# Patient Record
Sex: FEMALE | Race: OTHER | HISPANIC OR LATINO | URBAN - METROPOLITAN AREA
[De-identification: names, ages, dates, MRNs, and addresses within clinical notes are randomized per-mention and may not be internally consistent; named-entity substitution may affect disease eponyms.]

---

## 2018-09-24 ENCOUNTER — OUTPATIENT (OUTPATIENT)
Dept: OUTPATIENT SERVICES | Facility: HOSPITAL | Age: 33
LOS: 1 days | Discharge: HOME | End: 2018-09-24

## 2018-09-24 DIAGNOSIS — E78.00 PURE HYPERCHOLESTEROLEMIA, UNSPECIFIED: ICD-10-CM

## 2018-09-24 DIAGNOSIS — D64.9 ANEMIA, UNSPECIFIED: ICD-10-CM

## 2018-09-24 DIAGNOSIS — N18.2 CHRONIC KIDNEY DISEASE, STAGE 2 (MILD): ICD-10-CM

## 2019-08-23 ENCOUNTER — OUTPATIENT (OUTPATIENT)
Dept: OUTPATIENT SERVICES | Facility: HOSPITAL | Age: 34
LOS: 1 days | Discharge: HOME | End: 2019-08-23

## 2019-08-23 VITALS — SYSTOLIC BLOOD PRESSURE: 111 MMHG | DIASTOLIC BLOOD PRESSURE: 65 MMHG | HEART RATE: 80 BPM

## 2019-08-23 VITALS
RESPIRATION RATE: 20 BRPM | SYSTOLIC BLOOD PRESSURE: 111 MMHG | TEMPERATURE: 99 F | DIASTOLIC BLOOD PRESSURE: 65 MMHG | HEART RATE: 80 BPM

## 2019-08-23 NOTE — OB PROVIDER TRIAGE NOTE - HISTORY OF PRESENT ILLNESS
32yo  at 27w3d GA, by LMP c/w 1st trim sono, c/o gas pains and constipation x 2 days. Has h/o IBS on linaclotide. Presented to L&D for evaluation as pt was unsure if she could take stool softeners while pregnant. Also c/o hemorrhoids secondary to straining during defecation. Denies ctx, lower abdominal pain, VB/LOF. Good FM. Admits to drinking only 1 cup of water today. Last BM today- hard pebble-like stool. Last PO intake at 1700. Denies fever, chills, nausea/vomiting, diarrhea, dysuria, urgency, frequency. Denies complications during this pregnancy.

## 2019-08-23 NOTE — OB PROVIDER TRIAGE NOTE - NSOBPROVIDERNOTE_OBGYN_ALL_OB_FT
34yo  at 27w3d GA, GBS unknown, with BPP 8/8, reassuring maternal and fetal status, hemorrhoids and constipation, not in  labor  - d/c home   - ambulation/PO hydration  - stool softeners: colace 100mg BID PRN/dulcolax suppository   - simethicone PRN   -  labor precautions/FKC   - f/u next scheduled appt 32yo  at 27w3d GA, GBS unknown, with BPP 8/8, reassuring maternal and fetal status, hemorrhoids and constipation, not in  labor  - d/c home   - ambulation/PO hydration  - stool softeners: colace 100mg BID PRN/dulcolax suppository   - simethicone PRN   -  labor precautions/FKC   - f/u next scheduled appt     Dr. Ricci and Dr. Tafoya aware

## 2019-08-23 NOTE — OB PROVIDER TRIAGE NOTE - NSHPPHYSICALEXAM_GEN_ALL_CORE
Vital Signs Last 24 Hrs  T(C): 37.4 (23 Aug 2019 20:16), Max: 37.4 (23 Aug 2019 20:16)  T(F): 99.4 (23 Aug 2019 20:16), Max: 99.4 (23 Aug 2019 20:16)  HR: 80 (23 Aug 2019 20:16) (80 - 80)  BP: 111/65 (23 Aug 2019 20:16) (111/65 - 111/65)  RR: 20 (23 Aug 2019 20:16) (20 - 20)    EFM: 145/mod/accels+  [place]: irritable   Abd: soft, gravid, nontender, no palpable ctx  Speculum:   SVE: Vital Signs Last 24 Hrs  T(C): 37.4 (23 Aug 2019 20:16), Max: 37.4 (23 Aug 2019 20:16)  T(F): 99.4 (23 Aug 2019 20:16), Max: 99.4 (23 Aug 2019 20:16)  HR: 80 (23 Aug 2019 20:16) (80 - 80)  BP: 111/65 (23 Aug 2019 20:16) (111/65 - 111/65)  RR: 20 (23 Aug 2019 20:16) (20 - 20)    Udip: neg     EFM: 145/mod/accels+/occ variables  [place]: irritable   Abd: soft, gravid, nontender, no palpable ctx  Speculum: cervix appears normal, C/L/P, no active bleeding per os  SVE: C/L/P, vtx by sono, intact

## 2019-11-17 ENCOUNTER — INPATIENT (INPATIENT)
Facility: HOSPITAL | Age: 34
LOS: 1 days | Discharge: HOME | End: 2019-11-19
Attending: OBSTETRICS & GYNECOLOGY | Admitting: OBSTETRICS & GYNECOLOGY

## 2019-11-17 VITALS — HEART RATE: 86 BPM | DIASTOLIC BLOOD PRESSURE: 85 MMHG | SYSTOLIC BLOOD PRESSURE: 117 MMHG

## 2019-11-17 PROBLEM — K58.9 IRRITABLE BOWEL SYNDROME WITHOUT DIARRHEA: Chronic | Status: ACTIVE | Noted: 2019-08-23

## 2019-11-17 LAB
ABO RH CONFIRMATION: SIGNIFICANT CHANGE UP
APPEARANCE UR: CLEAR — SIGNIFICANT CHANGE UP
BACTERIA # UR AUTO: ABNORMAL
BASOPHILS # BLD AUTO: 0.01 K/UL — SIGNIFICANT CHANGE UP (ref 0–0.2)
BASOPHILS NFR BLD AUTO: 0.1 % — SIGNIFICANT CHANGE UP (ref 0–1)
BILIRUB UR-MCNC: NEGATIVE — SIGNIFICANT CHANGE UP
BLD GP AB SCN SERPL QL: SIGNIFICANT CHANGE UP
COLOR SPEC: SIGNIFICANT CHANGE UP
DIFF PNL FLD: ABNORMAL
EOSINOPHIL # BLD AUTO: 0.06 K/UL — SIGNIFICANT CHANGE UP (ref 0–0.7)
EOSINOPHIL NFR BLD AUTO: 0.6 % — SIGNIFICANT CHANGE UP (ref 0–8)
EPI CELLS # UR: 7 /HPF — HIGH (ref 0–5)
GLUCOSE UR QL: NEGATIVE — SIGNIFICANT CHANGE UP
HCT VFR BLD CALC: 39.5 % — SIGNIFICANT CHANGE UP (ref 37–47)
HGB BLD-MCNC: 12.5 G/DL — SIGNIFICANT CHANGE UP (ref 12–16)
HYALINE CASTS # UR AUTO: 1 /LPF — SIGNIFICANT CHANGE UP (ref 0–7)
IMM GRANULOCYTES NFR BLD AUTO: 0.5 % — HIGH (ref 0.1–0.3)
KETONES UR-MCNC: NEGATIVE — SIGNIFICANT CHANGE UP
LEUKOCYTE ESTERASE UR-ACNC: NEGATIVE — SIGNIFICANT CHANGE UP
LYMPHOCYTES # BLD AUTO: 1.84 K/UL — SIGNIFICANT CHANGE UP (ref 1.2–3.4)
LYMPHOCYTES # BLD AUTO: 19.7 % — LOW (ref 20.5–51.1)
MCHC RBC-ENTMCNC: 25.8 PG — LOW (ref 27–31)
MCHC RBC-ENTMCNC: 31.6 G/DL — LOW (ref 32–37)
MCV RBC AUTO: 81.6 FL — SIGNIFICANT CHANGE UP (ref 81–99)
MONOCYTES # BLD AUTO: 0.91 K/UL — HIGH (ref 0.1–0.6)
MONOCYTES NFR BLD AUTO: 9.7 % — HIGH (ref 1.7–9.3)
NEUTROPHILS # BLD AUTO: 6.47 K/UL — SIGNIFICANT CHANGE UP (ref 1.4–6.5)
NEUTROPHILS NFR BLD AUTO: 69.4 % — SIGNIFICANT CHANGE UP (ref 42.2–75.2)
NITRITE UR-MCNC: NEGATIVE — SIGNIFICANT CHANGE UP
NRBC # BLD: 0 /100 WBCS — SIGNIFICANT CHANGE UP (ref 0–0)
PH UR: 6.5 — SIGNIFICANT CHANGE UP (ref 5–8)
PLATELET # BLD AUTO: 189 K/UL — SIGNIFICANT CHANGE UP (ref 130–400)
PRENATAL SYPHILIS TEST: SIGNIFICANT CHANGE UP
PROT UR-MCNC: SIGNIFICANT CHANGE UP
RBC # BLD: 4.84 M/UL — SIGNIFICANT CHANGE UP (ref 4.2–5.4)
RBC # FLD: 13.9 % — SIGNIFICANT CHANGE UP (ref 11.5–14.5)
RBC CASTS # UR COMP ASSIST: 33 /HPF — HIGH (ref 0–4)
SP GR SPEC: 1.02 — SIGNIFICANT CHANGE UP (ref 1.01–1.02)
UROBILINOGEN FLD QL: SIGNIFICANT CHANGE UP
WBC # BLD: 9.34 K/UL — SIGNIFICANT CHANGE UP (ref 4.8–10.8)
WBC # FLD AUTO: 9.34 K/UL — SIGNIFICANT CHANGE UP (ref 4.8–10.8)
WBC UR QL: 6 /HPF — HIGH (ref 0–5)

## 2019-11-17 RX ORDER — SODIUM CHLORIDE 9 MG/ML
1000 INJECTION, SOLUTION INTRAVENOUS
Refills: 0 | Status: DISCONTINUED | OUTPATIENT
Start: 2019-11-17 | End: 2019-11-18

## 2019-11-17 RX ORDER — SODIUM CHLORIDE 9 MG/ML
3 INJECTION INTRAMUSCULAR; INTRAVENOUS; SUBCUTANEOUS EVERY 8 HOURS
Refills: 0 | Status: DISCONTINUED | OUTPATIENT
Start: 2019-11-17 | End: 2019-11-19

## 2019-11-17 RX ORDER — DEXAMETHASONE 0.5 MG/5ML
4 ELIXIR ORAL EVERY 6 HOURS
Refills: 0 | Status: DISCONTINUED | OUTPATIENT
Start: 2019-11-17 | End: 2019-11-19

## 2019-11-17 RX ORDER — ACETAMINOPHEN 500 MG
975 TABLET ORAL
Refills: 0 | Status: DISCONTINUED | OUTPATIENT
Start: 2019-11-17 | End: 2019-11-19

## 2019-11-17 RX ORDER — DIBUCAINE 1 %
1 OINTMENT (GRAM) RECTAL EVERY 6 HOURS
Refills: 0 | Status: DISCONTINUED | OUTPATIENT
Start: 2019-11-17 | End: 2019-11-19

## 2019-11-17 RX ORDER — DIPHENHYDRAMINE HCL 50 MG
25 CAPSULE ORAL EVERY 6 HOURS
Refills: 0 | Status: DISCONTINUED | OUTPATIENT
Start: 2019-11-17 | End: 2019-11-19

## 2019-11-17 RX ORDER — OXYTOCIN 10 UNIT/ML
2 VIAL (ML) INJECTION
Qty: 30 | Refills: 0 | Status: DISCONTINUED | OUTPATIENT
Start: 2019-11-17 | End: 2019-11-19

## 2019-11-17 RX ORDER — MAGNESIUM HYDROXIDE 400 MG/1
30 TABLET, CHEWABLE ORAL
Refills: 0 | Status: DISCONTINUED | OUTPATIENT
Start: 2019-11-17 | End: 2019-11-19

## 2019-11-17 RX ORDER — ONDANSETRON 8 MG/1
4 TABLET, FILM COATED ORAL EVERY 6 HOURS
Refills: 0 | Status: DISCONTINUED | OUTPATIENT
Start: 2019-11-17 | End: 2019-11-19

## 2019-11-17 RX ORDER — GLYCERIN ADULT
1 SUPPOSITORY, RECTAL RECTAL AT BEDTIME
Refills: 0 | Status: DISCONTINUED | OUTPATIENT
Start: 2019-11-17 | End: 2019-11-19

## 2019-11-17 RX ORDER — PRAMOXINE HYDROCHLORIDE 150 MG/15G
1 AEROSOL, FOAM RECTAL EVERY 4 HOURS
Refills: 0 | Status: DISCONTINUED | OUTPATIENT
Start: 2019-11-17 | End: 2019-11-19

## 2019-11-17 RX ORDER — KETOROLAC TROMETHAMINE 30 MG/ML
30 SYRINGE (ML) INJECTION ONCE
Refills: 0 | Status: DISCONTINUED | OUTPATIENT
Start: 2019-11-17 | End: 2019-11-17

## 2019-11-17 RX ORDER — BENZOCAINE 10 %
1 GEL (GRAM) MUCOUS MEMBRANE EVERY 6 HOURS
Refills: 0 | Status: DISCONTINUED | OUTPATIENT
Start: 2019-11-17 | End: 2019-11-19

## 2019-11-17 RX ORDER — OXYCODONE HYDROCHLORIDE 5 MG/1
5 TABLET ORAL
Refills: 0 | Status: DISCONTINUED | OUTPATIENT
Start: 2019-11-17 | End: 2019-11-19

## 2019-11-17 RX ORDER — INFLUENZA VIRUS VACCINE 15; 15; 15; 15 UG/.5ML; UG/.5ML; UG/.5ML; UG/.5ML
0.5 SUSPENSION INTRAMUSCULAR ONCE
Refills: 0 | Status: DISCONTINUED | OUTPATIENT
Start: 2019-11-17 | End: 2019-11-19

## 2019-11-17 RX ORDER — IBUPROFEN 200 MG
600 TABLET ORAL EVERY 6 HOURS
Refills: 0 | Status: DISCONTINUED | OUTPATIENT
Start: 2019-11-17 | End: 2019-11-19

## 2019-11-17 RX ORDER — LANOLIN
1 OINTMENT (GRAM) TOPICAL EVERY 6 HOURS
Refills: 0 | Status: DISCONTINUED | OUTPATIENT
Start: 2019-11-17 | End: 2019-11-19

## 2019-11-17 RX ORDER — NALOXONE HYDROCHLORIDE 4 MG/.1ML
0.1 SPRAY NASAL
Refills: 0 | Status: DISCONTINUED | OUTPATIENT
Start: 2019-11-17 | End: 2019-11-19

## 2019-11-17 RX ORDER — HYDROCORTISONE 1 %
1 OINTMENT (GRAM) TOPICAL EVERY 6 HOURS
Refills: 0 | Status: DISCONTINUED | OUTPATIENT
Start: 2019-11-17 | End: 2019-11-19

## 2019-11-17 RX ORDER — IBUPROFEN 200 MG
600 TABLET ORAL EVERY 6 HOURS
Refills: 0 | Status: COMPLETED | OUTPATIENT
Start: 2019-11-17 | End: 2020-10-15

## 2019-11-17 RX ORDER — AER TRAVELER 0.5 G/1
1 SOLUTION RECTAL; TOPICAL EVERY 4 HOURS
Refills: 0 | Status: DISCONTINUED | OUTPATIENT
Start: 2019-11-17 | End: 2019-11-19

## 2019-11-17 RX ORDER — ACETAMINOPHEN 500 MG
650 TABLET ORAL ONCE
Refills: 0 | Status: COMPLETED | OUTPATIENT
Start: 2019-11-17 | End: 2019-11-17

## 2019-11-17 RX ORDER — OXYCODONE HYDROCHLORIDE 5 MG/1
5 TABLET ORAL ONCE
Refills: 0 | Status: DISCONTINUED | OUTPATIENT
Start: 2019-11-17 | End: 2019-11-19

## 2019-11-17 RX ORDER — OXYTOCIN 10 UNIT/ML
333.33 VIAL (ML) INJECTION
Qty: 20 | Refills: 0 | Status: DISCONTINUED | OUTPATIENT
Start: 2019-11-17 | End: 2019-11-19

## 2019-11-17 RX ORDER — SIMETHICONE 80 MG/1
80 TABLET, CHEWABLE ORAL EVERY 4 HOURS
Refills: 0 | Status: DISCONTINUED | OUTPATIENT
Start: 2019-11-17 | End: 2019-11-19

## 2019-11-17 RX ADMIN — SODIUM CHLORIDE 3 MILLILITER(S): 9 INJECTION INTRAMUSCULAR; INTRAVENOUS; SUBCUTANEOUS at 21:30

## 2019-11-17 RX ADMIN — Medication 650 MILLIGRAM(S): at 18:07

## 2019-11-17 RX ADMIN — Medication 2 MILLIUNIT(S)/MIN: at 09:44

## 2019-11-17 NOTE — PROGRESS NOTE ADULT - SUBJECTIVE AND OBJECTIVE BOX
PGY1 Note    Patient seen at bedside. Pt states that she is experiencing intermittent pressure with contractions but still does not have an urge to push    T(F): 99.32 ( @ 16:48), Max: 99.32 ( @ 16:48)  HR: 113 ( @ 19:06)  BP: 123/57 ( @ 19:06) (101/57 - 147/78)  RR: 18 ( @ 08:39)    EFM: 160bpm/moderate variability/+accels  TOCO: q2min   SVE: 10/100/ per Dr. Landeros at 1753    Medications:  acetaminophen   Tablet ..: 650 ( @ 18:07)  oxytocin Infusion: 2 ( @ 09:36)      Labs:                        12.5   9.34  )-----------( 189      ( 2019 05:15 )             39.5           Prenatal Syphilis Test: Nonreact ( @ 05:15)  Antibody Screen: NEG (19 @ 05:15)    Urinalysis Basic - ( 2019 05:30 )    Color: Light Yellow / Appearance: Clear / S.023 / pH: x  Gluc: x / Ketone: Negative  / Bili: Negative / Urobili: <2 mg/dL   Blood: x / Protein: Trace / Nitrite: Negative   Leuk Esterase: Negative / RBC: 33 /HPF / WBC 6 /HPF   Sq Epi: x / Non Sq Epi: 7 /HPF / Bacteria: Few

## 2019-11-17 NOTE — PROGRESS NOTE ADULT - ASSESSMENT
35 yo  @39w5d, GBS neg, isolated elevated BPs, s/p epi, AROM, on pitocin, in active labor, doing well.    - pain management w/ epidural  - cont EFM and toco  - monitor vitals  - clear liquids diet, IV hydration  - c/w pitocin  - f/up rubella, hepB, HIV, UDS    Dr. Nguyen and Dr. Landeros aware.

## 2019-11-17 NOTE — PROGRESS NOTE ADULT - SUBJECTIVE AND OBJECTIVE BOX
PGY1 Note    Patient seen at bedside. No complaints at the moment. Pt still does not feel the urge to push.     T(F): 99.32 ( @ 16:48), Max: 99.32 ( @ 16:48)  HR: 93 ( @ 17:42)  BP: 118/63 ( @ 17:42) (101/57 - 147/78)  RR: 18 ( @ 08:39)    EFM:  TOCO:  SVE: 10/100/1 per Dr. Landeros at 1753    Medications:  acetaminophen   Tablet ..: 650 ( @ 18:07)  oxytocin Infusion: 2 ( @ 09:36)      Labs:                        12.5   9.34  )-----------( 189      ( 2019 05:15 )             39.5           Prenatal Syphilis Test: Nonreact ( @ 05:15)  Antibody Screen: NEG (19 @ 05:15)    Urinalysis Basic - ( 2019 05:30 )    Color: Light Yellow / Appearance: Clear / S.023 / pH: x  Gluc: x / Ketone: Negative  / Bili: Negative / Urobili: <2 mg/dL   Blood: x / Protein: Trace / Nitrite: Negative   Leuk Esterase: Negative / RBC: 33 /HPF / WBC 6 /HPF   Sq Epi: x / Non Sq Epi: 7 /HPF / Bacteria: Few PGY1 Note    Patient seen at bedside. No complaints at the moment. Pt still does not feel the urge to push.     T(F): 99.32 ( @ 16:48), Max: 99.32 ( @ 16:48)  HR: 93 ( @ 17:42)  BP: 118/63 ( @ 17:42) (101/57 - 147/78)  RR: 18 ( @ 08:39)    EFM: 155bpm/moderate variability/+accels  TOCO: q2min   SVE: 10/100/1 per Dr. Landeros at 1753    Medications:  acetaminophen   Tablet ..: 650 ( @ 18:07)  oxytocin Infusion: 2 ( @ 09:36)      Labs:                        12.5   9.34  )-----------( 189      ( 2019 05:15 )             39.5           Prenatal Syphilis Test: Nonreact ( @ 05:15)  Antibody Screen: NEG (19 @ 05:15)    Urinalysis Basic - ( 2019 05:30 )    Color: Light Yellow / Appearance: Clear / S.023 / pH: x  Gluc: x / Ketone: Negative  / Bili: Negative / Urobili: <2 mg/dL   Blood: x / Protein: Trace / Nitrite: Negative   Leuk Esterase: Negative / RBC: 33 /HPF / WBC 6 /HPF   Sq Epi: x / Non Sq Epi: 7 /HPF / Bacteria: Few

## 2019-11-17 NOTE — PROGRESS NOTE ADULT - ASSESSMENT
33yo  at 39w5d GA, GBS neg, in active labor, on pitocin for augmentation  - f/u prenatal records from office  - f/u pending labs  - continuous EFM/toco  - clear liquids  - pain mgmt prn   - IV hydration  - anticipate      Dr. Landeros aware.

## 2019-11-17 NOTE — PROGRESS NOTE ADULT - SUBJECTIVE AND OBJECTIVE BOX
OBGYN PGY3 Note:     Patient seen and examined at bedside. Comfortable, denies complaints.      T(C): 37 (19 @ 08:39), Max: 37.1 (19 @ 03:50)  HR: 71 (19 @ 10:13) (71 - 92)  BP: 105/59 (19 @ 10:13) (105/59 - 144/87)  RR: 18 (19 @ 08:39) (18 - 18)    EFM: 135/mod/accels+  [place]: q2-4  SVE: 6/80/-2 at 0937     Meds: dexAMETHasone  Injectable 4 milliGRAM(s) IV Push every 6 hours PRN  influenza   Vaccine 0.5 milliLiter(s) IntraMuscular once  lactated ringers. 1000 milliLiter(s) IV Continuous <Continuous>  naloxone Injectable 0.1 milliGRAM(s) IV Push every 3 minutes PRN  ondansetron Injectable 4 milliGRAM(s) IV Push every 6 hours PRN  oxytocin Infusion 333.333 milliUNIT(s)/Min IV Continuous <Continuous>  oxytocin Infusion 2 milliUNIT(s)/Min IV Continuous <Continuous> started at 0944, currently at 4mu/min  Epidural at 0844    Labs:                        12.5   9.34  )-----------( 189      ( 2019 05:15 )             39.5         Urinalysis Basic - ( 2019 05:30 )    Color: Light Yellow / Appearance: Clear / S.023 / pH: x  Gluc: x / Ketone: Negative  / Bili: Negative / Urobili: <2 mg/dL   Blood: x / Protein: Trace / Nitrite: Negative   Leuk Esterase: Negative / RBC: 33 /HPF / WBC 6 /HPF   Sq Epi: x / Non Sq Epi: 7 /HPF / Bacteria: Few         Prenatal Syphilis Test: Nonreact (19 @ 05:15)  ABO RH Interpretation: B POS (19 @ 05:15)

## 2019-11-17 NOTE — PROGRESS NOTE ADULT - SUBJECTIVE AND OBJECTIVE BOX
PGY1 Note    Patient seen at bedside after complaining of constant pressure and an urge to push.     T(F): 99.32 ( @ 16:48), Max: 99.32 ( @ 16:48)  HR: 120 ( @ 19:36)  BP: 126/60 ( @ 19:36) (101/57 - 147/78)  RR: 18 ( @ 08:39)    EFM: 160bpm/moderate variability/+accels  TOCO: q2min  SVE: 10/1, per Dr. Landeros    Medications:  acetaminophen   Tablet ..: 650 ( @ 18:07)  oxytocin Infusion: 2 ( @ 09:36)      Labs:                        12.5   9.34  )-----------( 189      ( 2019 05:15 )             39.5           Prenatal Syphilis Test: Nonreact ( @ 05:15)  Antibody Screen: NEG (19 @ 05:15)    Urinalysis Basic - ( 2019 05:30 )    Color: Light Yellow / Appearance: Clear / S.023 / pH: x  Gluc: x / Ketone: Negative  / Bili: Negative / Urobili: <2 mg/dL   Blood: x / Protein: Trace / Nitrite: Negative   Leuk Esterase: Negative / RBC: 33 /HPF / WBC 6 /HPF   Sq Epi: x / Non Sq Epi: 7 /HPF / Bacteria: Few

## 2019-11-17 NOTE — PROGRESS NOTE ADULT - SUBJECTIVE AND OBJECTIVE BOX
PGY 2 Note    S: Pt seen at bedside for labor check. Doing well, experiencing intermittent pressure with contractions.    Vital Signs Last 24 Hrs  T(F): 99.32 (2019 16:48), Max: 99.32 (2019 16:48)  HR: 101 (2019 17:26) (65 - 106)  BP: 137/64 (2019 17:26) (101/57 - 147/78)  RR: 18 (2019 08:39) (18 - 18)    EFM: 155/mod lisa/+accel  TOCO: q2-3min  SVE: 9/100/0, vtx, IUPC in place @1543    Labs:                        12.5   9.34  )-----------( 189      ( 2019 05:15 )             39.5             ABO RH Interpretation: B POS (19 @ 05:15)    Urinalysis Basic - ( 2019 05:30 )    Color: Light Yellow / Appearance: Clear / S.023 / pH: x  Gluc: x / Ketone: Negative  / Bili: Negative / Urobili: <2 mg/dL   Blood: x / Protein: Trace / Nitrite: Negative   Leuk Esterase: Negative / RBC: 33 /HPF / WBC 6 /HPF   Sq Epi: x / Non Sq Epi: 7 /HPF / Bacteria: Few        Prenatal Syphilis Test: Nonreact (19 @ 05:15)      Meds:  @0845 epidural  @0944 pit started, now @14mu/min

## 2019-11-17 NOTE — OB PROVIDER H&P - NS_FETALPRESSONO_OBGYN_ALL_OB
Patient requesting refill of:    Alprazolam  Qty: 90  Days: 90  Refills: 1 Prescribed: 11/6/2017  Last available refill dispensed: 11/6/2017    PDMP reviewed?  Yes  [x]    No  []   Requesting/dispensing early refills?  Yes  []    No  [x]   Other controlled substances? Short term fill of Guaifenesin-Codeine in January    Date last seen 11-6-17  Next scheduled visit 11-7-18       Cephalic

## 2019-11-17 NOTE — OB PROVIDER H&P - HISTORY OF PRESENT ILLNESS
34yo  at 39w5d EDC 19 by LMP here after noticing red vaginal discharge while urinating. Se reports only one episode, denies any dysuria, urinary urgency or frequency. Denies ctx or LOF. Good fetal movement. Denies any complications with this pregnancy. Last appointment at PMD office on , pt was 1cm dilated. GBS neg per patient.

## 2019-11-17 NOTE — PROGRESS NOTE ADULT - SUBJECTIVE AND OBJECTIVE BOX
PGY 2 Note    S: Pt seen and examined at bedside for labor check. Doing well, pain controlled w/ epidural.     Vital Signs Last 24 Hrs  T(F): 98.6 (2019 04:58), Max: 98.8 (2019 03:54)  HR: 90 (2019 14:41) (65 - 92)  BP: 129/79 (2019 14:41) (101/57 - 144/87)  RR: 18 (2019 08:39) (18 - 18)    EFM: 135/mod lisa/+accel  TOCO: q2-3min  SVE: 8/100/-1, vtx, IUPC in place    Labs:                        12.5   9.34  )-----------( 189      ( 2019 05:15 )             39.5             ABO RH Interpretation: B POS (19 @ 05:15)    Urinalysis Basic - ( 2019 05:30 )    Color: Light Yellow / Appearance: Clear / S.023 / pH: x  Gluc: x / Ketone: Negative  / Bili: Negative / Urobili: <2 mg/dL   Blood: x / Protein: Trace / Nitrite: Negative   Leuk Esterase: Negative / RBC: 33 /HPF / WBC 6 /HPF   Sq Epi: x / Non Sq Epi: 7 /HPF / Bacteria: Few        Prenatal Syphilis Test: Nonreact (19 @ 05:15)      Meds:  @0845 epidural  @0944 pit started, now @16mu/min

## 2019-11-17 NOTE — PROGRESS NOTE ADULT - ASSESSMENT
35 yo  @39w5d, GBS neg, isolated elevated BPs, s/p epi, AROM, on pitocin, in active labor, doing well.    - pain management w/ epidural  - cont EFM and toco  - monitor vitals  - clear liquids diet, IV hydration  - c/w pitocin    Dr. Nguyen and Dr. Landeros aware. 35 yo  @39w5d, GBS neg, isolated elevated BPs, s/p epi, AROM, on pitocin, in active labor, doing well.    - pain management w/ epidural  - cont EFM and toco  - monitor vitals  - clear liquids diet, IV hydration  - c/w pitocin  - f/up rubella, hepB, HIV, UDS    Dr. Nguyen and Dr. Landeros aware.

## 2019-11-17 NOTE — OB PROVIDER H&P - ASSESSMENT
A/P: 33yo  at 39w5d, GBS neg, in labor at term    - admit to L&D  - pitocin for augmentation  - f/u admission labs, prenatal labs  - cont EFM and toco  - pain management PRN  - IV hydration, clear liquid diet  - monitor vitals    Dr. Krueger and Dr. Landeros aware.

## 2019-11-17 NOTE — OB PROVIDER DELIVERY SUMMARY - NSPROVIDERDELIVERYNOTE_OBGYN_ALL_OB_FT
TOBY  compound left hand  Meconium therefore no stimulation of baby  baby handed off to awaiting pediatritions immediately   1st degree lac repaired with chromic  apgar 7/9

## 2019-11-17 NOTE — PROGRESS NOTE ADULT - ASSESSMENT
A/P: 35 yo  at 39w5d, GBS neg, isolated elevated BPs, s/p epi, AROM, on pitocin, in active labor, doing well.  - pain management w/ epidural  - cont EFM and toco  - monitor vitals  - clear liquids diet, IV hydration  - c/w pitocin  - f/up rubella, hepB, HIV, UDS    Dr. Tafoya aware and Dr. Landeros to be made aware

## 2019-11-17 NOTE — PROGRESS NOTE ADULT - ASSESSMENT
A/P: 33 yo  at 39w5d, GBS neg, isolated elevated BPs, s/p epi, AROM, on pitocin, in active labor, doing well.  - discontinue epidural as pt not pushing effectively per PMD  - cont EFM and toco  - monitor vitals  - clear liquids diet, IV hydration  - c/w pitocin  - f/up rubella, hepB, HIV, UDS  - re-evaluate pt prn when pt feeling more of a desire to push     Dr. Tafoya and Dr. Landeros aware

## 2019-11-17 NOTE — OB PROVIDER H&P - NSHPPHYSICALEXAM_GEN_ALL_CORE
Vital Signs Last 24 Hrs  T(C): 37.1 (17 Nov 2019 03:54), Max: 37.1 (17 Nov 2019 03:50)  T(F): 98.8 (17 Nov 2019 03:54), Max: 98.8 (17 Nov 2019 03:54)  HR: 86 (17 Nov 2019 03:54) (86 - 86)  BP: 117/85 (17 Nov 2019 03:54) (117/85 - 117/85)  RR: 18 (17 Nov 2019 03:54) (18 - 18)    Gen: AOx3, no acute distress  CVS: RRR  Lungs: CTABL  Abd: soft, gravid, non tender, mildly palpable contractions  Ext: No edema bilaterally    Udip     EFM:  140/mod/+accels; cat 1  [place]: q2-4 mins  SVE: 4/50/-2 vertex intact     Speculum exam: 5cc of dark blood and mucus in vagina, no active bleeding, no lesions    BSS: cephalic, ant placenta, MVP 6.9cm

## 2019-11-17 NOTE — OB RN PATIENT PROFILE - NS TRANSFER PATIENT BELONGINGS
Jeffrey Dub 38   Date:   1/29/2021     Name:   Estella Hartmann    YOB: 1953   MRN:   [id number]       WHERE IS YOUR PAIN NOW? [place] the areas on your body where you feel the described sensations.   Use the appropriate None

## 2019-11-17 NOTE — PROGRESS NOTE ADULT - ASSESSMENT
A/P: 33 yo  at 39w5d, GBS neg, isolated elevated BPs, s/p epi, AROM, on pitocin, in active labor, doing well.  - pain management w/ epidural  - cont EFM and toco  - monitor vitals  - clear liquids diet, IV hydration  - c/w pitocin  - f/up rubella, hepB, HIV, UDS    Dr. Tafoya aware and Dr. Landeros to be made aware

## 2019-11-18 LAB
AMPHET UR-MCNC: NEGATIVE — SIGNIFICANT CHANGE UP
BARBITURATES UR SCN-MCNC: NEGATIVE — SIGNIFICANT CHANGE UP
BASOPHILS # BLD AUTO: 0 K/UL — SIGNIFICANT CHANGE UP (ref 0–0.2)
BASOPHILS NFR BLD AUTO: 0 % — SIGNIFICANT CHANGE UP (ref 0–1)
BENZODIAZ UR-MCNC: NEGATIVE — SIGNIFICANT CHANGE UP
BUPRENORPHINE SCREEN, URINE RESULT: NEGATIVE — SIGNIFICANT CHANGE UP
COCAINE METAB.OTHER UR-MCNC: NEGATIVE — SIGNIFICANT CHANGE UP
CREAT ?TM UR-MCNC: 60 MG/DL — SIGNIFICANT CHANGE UP
EOSINOPHIL # BLD AUTO: 0 K/UL — SIGNIFICANT CHANGE UP (ref 0–0.7)
EOSINOPHIL NFR BLD AUTO: 0 % — SIGNIFICANT CHANGE UP (ref 0–8)
FENTANYL UR QL: NEGATIVE — SIGNIFICANT CHANGE UP
GIANT PLATELETS BLD QL SMEAR: PRESENT — SIGNIFICANT CHANGE UP
HBV SURFACE AG SER-ACNC: SIGNIFICANT CHANGE UP
HCT VFR BLD CALC: 32.8 % — LOW (ref 37–47)
HGB BLD-MCNC: 10.7 G/DL — LOW (ref 12–16)
HIV 1+2 AB+HIV1 P24 AG SERPL QL IA: SIGNIFICANT CHANGE UP
L&D DRUG SCREEN, URINE: SIGNIFICANT CHANGE UP
LYMPHOCYTES # BLD AUTO: 0.84 K/UL — LOW (ref 1.2–3.4)
LYMPHOCYTES # BLD AUTO: 2.6 % — LOW (ref 20.5–51.1)
MANUAL SMEAR VERIFICATION: SIGNIFICANT CHANGE UP
MCHC RBC-ENTMCNC: 26.1 PG — LOW (ref 27–31)
MCHC RBC-ENTMCNC: 32.6 G/DL — SIGNIFICANT CHANGE UP (ref 32–37)
MCV RBC AUTO: 80 FL — LOW (ref 81–99)
METHADONE UR-MCNC: NEGATIVE — SIGNIFICANT CHANGE UP
MONOCYTES # BLD AUTO: 0.84 K/UL — HIGH (ref 0.1–0.6)
MONOCYTES NFR BLD AUTO: 2.6 % — SIGNIFICANT CHANGE UP (ref 1.7–9.3)
NEUTROPHILS # BLD AUTO: 29.68 K/UL — HIGH (ref 1.4–6.5)
NEUTROPHILS NFR BLD AUTO: 92.2 % — HIGH (ref 42.2–75.2)
OPIATES UR-MCNC: NEGATIVE — SIGNIFICANT CHANGE UP
OXYCODONE UR-MCNC: NEGATIVE — SIGNIFICANT CHANGE UP
PCP UR-MCNC: NEGATIVE — SIGNIFICANT CHANGE UP
PLAT MORPH BLD: NORMAL — SIGNIFICANT CHANGE UP
PLATELET # BLD AUTO: 161 K/UL — SIGNIFICANT CHANGE UP (ref 130–400)
PROPOXYPHENE QUALITATIVE URINE RESULT: NEGATIVE — SIGNIFICANT CHANGE UP
PROT ?TM UR-MCNC: 51 MG/DLG/24H — SIGNIFICANT CHANGE UP
PROT/CREAT UR-RTO: 0.8 RATIO — HIGH (ref 0–0.2)
RBC # BLD: 4.1 M/UL — LOW (ref 4.2–5.4)
RBC # FLD: 13.7 % — SIGNIFICANT CHANGE UP (ref 11.5–14.5)
RBC BLD AUTO: NORMAL — SIGNIFICANT CHANGE UP
RUBV IGG SER-ACNC: 2 INDEX — SIGNIFICANT CHANGE UP
RUBV IGG SER-IMP: POSITIVE — SIGNIFICANT CHANGE UP
VARIANT LYMPHS # BLD: 2.6 % — SIGNIFICANT CHANGE UP (ref 0–5)
WBC # BLD: 32.19 K/UL — HIGH (ref 4.8–10.8)
WBC # FLD AUTO: 32.19 K/UL — HIGH (ref 4.8–10.8)

## 2019-11-18 RX ADMIN — Medication 600 MILLIGRAM(S): at 14:00

## 2019-11-18 RX ADMIN — Medication 975 MILLIGRAM(S): at 08:36

## 2019-11-18 RX ADMIN — SODIUM CHLORIDE 3 MILLILITER(S): 9 INJECTION INTRAMUSCULAR; INTRAVENOUS; SUBCUTANEOUS at 20:49

## 2019-11-18 RX ADMIN — SODIUM CHLORIDE 3 MILLILITER(S): 9 INJECTION INTRAMUSCULAR; INTRAVENOUS; SUBCUTANEOUS at 05:06

## 2019-11-18 RX ADMIN — Medication 975 MILLIGRAM(S): at 19:00

## 2019-11-18 RX ADMIN — SODIUM CHLORIDE 3 MILLILITER(S): 9 INJECTION INTRAMUSCULAR; INTRAVENOUS; SUBCUTANEOUS at 14:00

## 2019-11-18 RX ADMIN — Medication 600 MILLIGRAM(S): at 11:15

## 2019-11-18 RX ADMIN — Medication 600 MILLIGRAM(S): at 23:57

## 2019-11-18 RX ADMIN — Medication 1 TABLET(S): at 11:15

## 2019-11-18 RX ADMIN — Medication 600 MILLIGRAM(S): at 00:05

## 2019-11-18 RX ADMIN — Medication 600 MILLIGRAM(S): at 17:18

## 2019-11-18 RX ADMIN — Medication 600 MILLIGRAM(S): at 05:06

## 2019-11-18 RX ADMIN — Medication 975 MILLIGRAM(S): at 14:24

## 2019-11-18 RX ADMIN — Medication 975 MILLIGRAM(S): at 20:50

## 2019-11-18 NOTE — PROGRESS NOTE ADULT - ASSESSMENT
A/P: 33yo now  PPD#1 S/P , with gHTN r/o pre-eclapmpsia, with blood pressures postpartum wnl, recovering well   - encourage ambulation  - encourage PO hydration  - monitor vitals, bleeding  - f/u AM CBC   - f/u Upr/cr (nurses aware to collect)  -f/u HIV, Hep B, and rubella   - routine postpartum course  - anticipate d/c home tomorrow    Dr. Diaz aware and Dr. Ladneros to be made aware

## 2019-11-18 NOTE — PROGRESS NOTE ADULT - SUBJECTIVE AND OBJECTIVE BOX
DION MUHAMMAD  34y  Female    PGY1 Note:  PPD#1   Pt is recovering well, no acute complaints. Pt states her pain is well controlled. Pt denies fever, chills, nausea, vomiting, SOB, chest pain, extremity swelling or pain. Pt denies HA, vision changes, RUQ or epigastric pain, or sudden onset of lower extremity swelling.    Ambulating: Yes  Voiding: Yes  Flatus: Yes  Bowel movements: Yes   Breast or bottle feeding: Both   Diet: Regular    MEDICATIONS  (STANDING):  acetaminophen   Tablet .. 975 milliGRAM(s) Oral <User Schedule>  ibuprofen  Tablet. 600 milliGRAM(s) Oral every 6 hours  influenza   Vaccine 0.5 milliLiter(s) IntraMuscular once  oxytocin Infusion 333.333 milliUNIT(s)/Min (1000 mL/Hr) IV Continuous <Continuous>  oxytocin Infusion 2 milliUNIT(s)/Min (2 mL/Hr) IV Continuous <Continuous>  prenatal multivitamin 1 Tablet(s) Oral daily  sodium chloride 0.9% lock flush 3 milliLiter(s) IV Push every 8 hours    MEDICATIONS  (PRN):  benzocaine 20%/menthol 0.5% Spray 1 Spray(s) Topical every 6 hours PRN for Perineal discomfort  dexAMETHasone  Injectable 4 milliGRAM(s) IV Push every 6 hours PRN Nausea  dibucaine 1% Ointment 1 Application(s) Topical every 6 hours PRN Perineal discomfort  diphenhydrAMINE 25 milliGRAM(s) Oral every 6 hours PRN Pruritus  glycerin Suppository - Adult 1 Suppository(s) Rectal at bedtime PRN Constipation  hydrocortisone 1% Cream 1 Application(s) Topical every 6 hours PRN Moderate Pain (4-6)  lanolin Ointment 1 Application(s) Topical every 6 hours PRN nipple soreness  magnesium hydroxide Suspension 30 milliLiter(s) Oral two times a day PRN Constipation  naloxone Injectable 0.1 milliGRAM(s) IV Push every 3 minutes PRN For ANY of the following changes in patient status:  A. RR LESS THAN 10 breaths per minute, B. Oxygen saturation LESS THAN 90%, C. Sedation score of 6  ondansetron Injectable 4 milliGRAM(s) IV Push every 6 hours PRN Nausea  oxyCODONE    IR 5 milliGRAM(s) Oral every 3 hours PRN Moderate to Severe Pain (4-10)  oxyCODONE    IR 5 milliGRAM(s) Oral once PRN Moderate to Severe Pain (4-10)  pramoxine 1%/zinc 5% Cream 1 Application(s) Topical every 4 hours PRN Moderate Pain (4-6)  simethicone 80 milliGRAM(s) Chew every 4 hours PRN Gas  witch hazel Pads 1 Application(s) Topical every 4 hours PRN Perineal discomfort    Physical Exam  Vital Signs Last 24 Hrs  T(C): 37.6 (18 Nov 2019 00:45), Max: 37.9 (17 Nov 2019 20:03)  T(F): 99.6 (18 Nov 2019 00:45), Max: 100.22 (17 Nov 2019 20:03)  HR: 99 (18 Nov 2019 00:45) (65 - 130)  BP: 137/74 (18 Nov 2019 00:45) (101/57 - 147/78)  RR: 18 (18 Nov 2019 00:45) (18 - 18)    Gen: AAOx3, NAD  Fundus: firm, below umbilicus   Abd: Soft, nontender, nondistended, BS+  Lochia: minimal rubra  Ext: No calf tenderness, no swelling    Labs:                        12.5   9.34  )-----------( 189      ( 17 Nov 2019 05:15 )             39.5

## 2019-11-19 ENCOUNTER — TRANSCRIPTION ENCOUNTER (OUTPATIENT)
Age: 34
End: 2019-11-19

## 2019-11-19 VITALS
TEMPERATURE: 98 F | HEART RATE: 88 BPM | DIASTOLIC BLOOD PRESSURE: 61 MMHG | SYSTOLIC BLOOD PRESSURE: 123 MMHG | RESPIRATION RATE: 18 BRPM

## 2019-11-19 LAB
ALBUMIN SERPL ELPH-MCNC: 3.1 G/DL — LOW (ref 3.5–5.2)
ALP SERPL-CCNC: 123 U/L — HIGH (ref 30–115)
ALT FLD-CCNC: 18 U/L — SIGNIFICANT CHANGE UP (ref 0–41)
ANION GAP SERPL CALC-SCNC: 13 MMOL/L — SIGNIFICANT CHANGE UP (ref 7–14)
AST SERPL-CCNC: 40 U/L — SIGNIFICANT CHANGE UP (ref 0–41)
BASOPHILS # BLD AUTO: 0.03 K/UL — SIGNIFICANT CHANGE UP (ref 0–0.2)
BASOPHILS NFR BLD AUTO: 0.2 % — SIGNIFICANT CHANGE UP (ref 0–1)
BILIRUB SERPL-MCNC: 0.3 MG/DL — SIGNIFICANT CHANGE UP (ref 0.2–1.2)
BUN SERPL-MCNC: 13 MG/DL — SIGNIFICANT CHANGE UP (ref 10–20)
CALCIUM SERPL-MCNC: 8.7 MG/DL — SIGNIFICANT CHANGE UP (ref 8.5–10.1)
CHLORIDE SERPL-SCNC: 105 MMOL/L — SIGNIFICANT CHANGE UP (ref 98–110)
CO2 SERPL-SCNC: 22 MMOL/L — SIGNIFICANT CHANGE UP (ref 17–32)
CREAT SERPL-MCNC: 0.6 MG/DL — LOW (ref 0.7–1.5)
EOSINOPHIL # BLD AUTO: 0.15 K/UL — SIGNIFICANT CHANGE UP (ref 0–0.7)
EOSINOPHIL NFR BLD AUTO: 0.9 % — SIGNIFICANT CHANGE UP (ref 0–8)
GLUCOSE SERPL-MCNC: 92 MG/DL — SIGNIFICANT CHANGE UP (ref 70–99)
HCT VFR BLD CALC: 29.9 % — LOW (ref 37–47)
HGB BLD-MCNC: 9.8 G/DL — LOW (ref 12–16)
IMM GRANULOCYTES NFR BLD AUTO: 0.7 % — HIGH (ref 0.1–0.3)
LYMPHOCYTES # BLD AUTO: 1.87 K/UL — SIGNIFICANT CHANGE UP (ref 1.2–3.4)
LYMPHOCYTES # BLD AUTO: 11 % — LOW (ref 20.5–51.1)
MCHC RBC-ENTMCNC: 26.5 PG — LOW (ref 27–31)
MCHC RBC-ENTMCNC: 32.8 G/DL — SIGNIFICANT CHANGE UP (ref 32–37)
MCV RBC AUTO: 80.8 FL — LOW (ref 81–99)
MONOCYTES # BLD AUTO: 0.9 K/UL — HIGH (ref 0.1–0.6)
MONOCYTES NFR BLD AUTO: 5.3 % — SIGNIFICANT CHANGE UP (ref 1.7–9.3)
NEUTROPHILS # BLD AUTO: 13.87 K/UL — HIGH (ref 1.4–6.5)
NEUTROPHILS NFR BLD AUTO: 81.9 % — HIGH (ref 42.2–75.2)
NRBC # BLD: 0 /100 WBCS — SIGNIFICANT CHANGE UP (ref 0–0)
PLATELET # BLD AUTO: 159 K/UL — SIGNIFICANT CHANGE UP (ref 130–400)
POTASSIUM SERPL-MCNC: 4.2 MMOL/L — SIGNIFICANT CHANGE UP (ref 3.5–5)
POTASSIUM SERPL-SCNC: 4.2 MMOL/L — SIGNIFICANT CHANGE UP (ref 3.5–5)
PROT SERPL-MCNC: 5.8 G/DL — LOW (ref 6–8)
RBC # BLD: 3.7 M/UL — LOW (ref 4.2–5.4)
RBC # FLD: 14 % — SIGNIFICANT CHANGE UP (ref 11.5–14.5)
SODIUM SERPL-SCNC: 140 MMOL/L — SIGNIFICANT CHANGE UP (ref 135–146)
WBC # BLD: 16.94 K/UL — HIGH (ref 4.8–10.8)
WBC # FLD AUTO: 16.94 K/UL — HIGH (ref 4.8–10.8)

## 2019-11-19 RX ORDER — ACETAMINOPHEN 500 MG
3 TABLET ORAL
Qty: 0 | Refills: 0 | DISCHARGE
Start: 2019-11-19

## 2019-11-19 RX ORDER — IBUPROFEN 200 MG
1 TABLET ORAL
Qty: 0 | Refills: 0 | DISCHARGE
Start: 2019-11-19

## 2019-11-19 RX ADMIN — Medication 600 MILLIGRAM(S): at 11:28

## 2019-11-19 RX ADMIN — Medication 975 MILLIGRAM(S): at 03:14

## 2019-11-19 RX ADMIN — Medication 600 MILLIGRAM(S): at 06:32

## 2019-11-19 RX ADMIN — SODIUM CHLORIDE 3 MILLILITER(S): 9 INJECTION INTRAMUSCULAR; INTRAVENOUS; SUBCUTANEOUS at 06:31

## 2019-11-19 RX ADMIN — Medication 975 MILLIGRAM(S): at 09:01

## 2019-11-19 RX ADMIN — Medication 1 TABLET(S): at 11:28

## 2019-11-19 NOTE — DISCHARGE NOTE OB - CARE PLAN
Principal Discharge DX:	Vaginal delivery  Goal:	healthy mother and baby  Assessment and plan of treatment:	vitals and labs  Secondary Diagnosis:	Pre-eclampsia affecting childbirth  Goal:	healthy mother and baby  Assessment and plan of treatment:	vitals and labs

## 2019-11-19 NOTE — DISCHARGE NOTE OB - MEDICATION SUMMARY - MEDICATIONS TO TAKE

## 2019-11-19 NOTE — PROGRESS NOTE ADULT - SUBJECTIVE AND OBJECTIVE BOX
DION MUHAMMAD  34y  Female    PGY1 Note:  PPD#1   Pt is recovering well, no acute complaints. Pt states her pain is well controlled. Pt denies fever, chills, nausea, vomiting, SOB, chest pain, extremity swelling or pain. Pt denies headache, changes in vision, RUQ pain or palpitations.     Ambulating: Yes  Voiding: Yes  Flatus: Yes  Bowel movements: Yes   Breast or bottle feeding: Both   Diet: Regular    MEDICATIONS  (STANDING):  acetaminophen   Tablet .. 975 milliGRAM(s) Oral <User Schedule>  ibuprofen  Tablet. 600 milliGRAM(s) Oral every 6 hours  influenza   Vaccine 0.5 milliLiter(s) IntraMuscular once  oxytocin Infusion 333.333 milliUNIT(s)/Min (1000 mL/Hr) IV Continuous <Continuous>  oxytocin Infusion 2 milliUNIT(s)/Min (2 mL/Hr) IV Continuous <Continuous>  prenatal multivitamin 1 Tablet(s) Oral daily  sodium chloride 0.9% lock flush 3 milliLiter(s) IV Push every 8 hours    MEDICATIONS  (PRN):  benzocaine 20%/menthol 0.5% Spray 1 Spray(s) Topical every 6 hours PRN for Perineal discomfort  dexAMETHasone  Injectable 4 milliGRAM(s) IV Push every 6 hours PRN Nausea  dibucaine 1% Ointment 1 Application(s) Topical every 6 hours PRN Perineal discomfort  diphenhydrAMINE 25 milliGRAM(s) Oral every 6 hours PRN Pruritus  glycerin Suppository - Adult 1 Suppository(s) Rectal at bedtime PRN Constipation  hydrocortisone 1% Cream 1 Application(s) Topical every 6 hours PRN Moderate Pain (4-6)  lanolin Ointment 1 Application(s) Topical every 6 hours PRN nipple soreness  magnesium hydroxide Suspension 30 milliLiter(s) Oral two times a day PRN Constipation  naloxone Injectable 0.1 milliGRAM(s) IV Push every 3 minutes PRN For ANY of the following changes in patient status:  A. RR LESS THAN 10 breaths per minute, B. Oxygen saturation LESS THAN 90%, C. Sedation score of 6  ondansetron Injectable 4 milliGRAM(s) IV Push every 6 hours PRN Nausea  oxyCODONE    IR 5 milliGRAM(s) Oral every 3 hours PRN Moderate to Severe Pain (4-10)  oxyCODONE    IR 5 milliGRAM(s) Oral once PRN Moderate to Severe Pain (4-10)  pramoxine 1%/zinc 5% Cream 1 Application(s) Topical every 4 hours PRN Moderate Pain (4-6)  simethicone 80 milliGRAM(s) Chew every 4 hours PRN Gas  witch hazel Pads 1 Application(s) Topical every 4 hours PRN Perineal discomfort    Physical Exam  Vital Signs Last 24 Hrs  T(C): 36.5 (2019 23:18), Max: 36.9 (2019 08:50)  T(F): 97.7 (2019 23:18), Max: 98.5 (2019 08:50)  HR: 83 (2019 04:52) (82 - 100)  BP: 107/59 (2019 04:52) (107/59 - 155/76)  RR: 17 (2019 04:52) (17 - 18)    Gen: AAOx3, NAD  Fundus: firm, below umbilicus   Abd: Soft, nontender, nondistended, BS+  Lochia: minimal rubra  Ext: No calf tenderness, no swelling    Labs:                        10.7   32.19 )-----------( 161      ( 2019 12:54 )             32.8                         12.5   9.34  )-----------( 189      ( 2019 05:15 )             39.5        A/P: S/P  PPD  , recovering well   - encourage ambulation, PO hydration  - monitor vitals, bleeding  - f/u AM CBC   - routine postpartum course  - anticipate d/c home DION MUHAMMAD  34y  Female    PGY1 Note:  PPD#1   Pt is recovering well, no acute complaints. Pt states her pain is well controlled. Pt denies fever, chills, nausea, vomiting, SOB, chest pain, extremity swelling or pain. Pt denies headache, changes in vision, RUQ pain or palpitations.     Ambulating: Yes  Voiding: Yes  Flatus: Yes  Bowel movements: Yes   Breast or bottle feeding: Both   Diet: Regular    MEDICATIONS  (STANDING):  acetaminophen   Tablet .. 975 milliGRAM(s) Oral <User Schedule>  ibuprofen  Tablet. 600 milliGRAM(s) Oral every 6 hours  influenza   Vaccine 0.5 milliLiter(s) IntraMuscular once  oxytocin Infusion 333.333 milliUNIT(s)/Min (1000 mL/Hr) IV Continuous <Continuous>  oxytocin Infusion 2 milliUNIT(s)/Min (2 mL/Hr) IV Continuous <Continuous>  prenatal multivitamin 1 Tablet(s) Oral daily  sodium chloride 0.9% lock flush 3 milliLiter(s) IV Push every 8 hours    MEDICATIONS  (PRN):  benzocaine 20%/menthol 0.5% Spray 1 Spray(s) Topical every 6 hours PRN for Perineal discomfort  dexAMETHasone  Injectable 4 milliGRAM(s) IV Push every 6 hours PRN Nausea  dibucaine 1% Ointment 1 Application(s) Topical every 6 hours PRN Perineal discomfort  diphenhydrAMINE 25 milliGRAM(s) Oral every 6 hours PRN Pruritus  glycerin Suppository - Adult 1 Suppository(s) Rectal at bedtime PRN Constipation  hydrocortisone 1% Cream 1 Application(s) Topical every 6 hours PRN Moderate Pain (4-6)  lanolin Ointment 1 Application(s) Topical every 6 hours PRN nipple soreness  magnesium hydroxide Suspension 30 milliLiter(s) Oral two times a day PRN Constipation  naloxone Injectable 0.1 milliGRAM(s) IV Push every 3 minutes PRN For ANY of the following changes in patient status:  A. RR LESS THAN 10 breaths per minute, B. Oxygen saturation LESS THAN 90%, C. Sedation score of 6  ondansetron Injectable 4 milliGRAM(s) IV Push every 6 hours PRN Nausea  oxyCODONE    IR 5 milliGRAM(s) Oral every 3 hours PRN Moderate to Severe Pain (4-10)  oxyCODONE    IR 5 milliGRAM(s) Oral once PRN Moderate to Severe Pain (4-10)  pramoxine 1%/zinc 5% Cream 1 Application(s) Topical every 4 hours PRN Moderate Pain (4-6)  simethicone 80 milliGRAM(s) Chew every 4 hours PRN Gas  witch hazel Pads 1 Application(s) Topical every 4 hours PRN Perineal discomfort    Physical Exam  Vital Signs Last 24 Hrs  T(C): 36.5 (18 Nov 2019 23:18), Max: 36.9 (18 Nov 2019 08:50)  T(F): 97.7 (18 Nov 2019 23:18), Max: 98.5 (18 Nov 2019 08:50)  HR: 83 (19 Nov 2019 04:52) (82 - 100)  BP: 107/59 (19 Nov 2019 04:52) (107/59 - 155/76)  RR: 17 (19 Nov 2019 04:52) (17 - 18)    Gen: AAOx3, NAD  Fundus: firm, below umbilicus   Abd: Soft, nontender, nondistended, BS+  Lochia: minimal rubra  Ext: No calf tenderness, no swelling    Labs:                        10.7   32.19 )-----------( 161      ( 18 Nov 2019 12:54 )             32.8                         12.5   9.34  )-----------( 189      ( 17 Nov 2019 05:15 )             39.5

## 2019-11-19 NOTE — DISCHARGE NOTE OB - HOSPITAL COURSE
uncomplicated vaginal delivery, uncomplicated postpartum course, discharged home in stable condition

## 2019-11-19 NOTE — DISCHARGE NOTE OB - CARE PROVIDER_API CALL
Marybel Landeros)  Obstetrics and Gynecology  [address] [place] [address]  Phone: [phone]  Fax: [phone]  Follow Up Time:

## 2019-11-19 NOTE — DISCHARGE NOTE OB - ADDITIONAL INSTRUCTIONS
If you expirence any of the following, please notify your provider:  -fever >100.4F  -increased vaginal bleeding or clotting (saturating a pad an hour)  -foul smelling discharge or bloody discharge from your incision site  -severe abdominal, vaginal, or rectal pain   -persistent headache or vision changes  -swollen areas on your legs that are red, hot, or painful   -swollen, hot, painful areas and/or streaks on your breasts  -cracked or bleeding nipples  -mood swings, depression, or crying spells lasting more than 3 days     Please schedule an appointment to see your provider in 1 week for a blood pressure check If you expirence any of the following, please notify your provider:  -fever >100.4F  -increased vaginal bleeding or clotting (saturating a pad an hour)  -foul smelling discharge or bloody discharge from your incision site  -severe abdominal, vaginal, or rectal pain   -persistent headache or vision changes  -swollen areas on your legs that are red, hot, or painful   -swollen, hot, painful areas and/or streaks on your breasts  -cracked or bleeding nipples  -mood swings, depression, or crying spells lasting more than 3 days     Please schedule an appointment to see your provider in on 11/20 at 10AM for a blood pressure check.

## 2019-11-19 NOTE — PROGRESS NOTE ADULT - ASSESSMENT
A/P: 35yo now  PPD#2 S/P , with pre-eclapmpsia w/o severe features (Upr/cr-0.8), with blood pressures postpartum wnl, recovering well   - encourage ambulation  - encourage PO hydration  - monitor vitals, bleeding  - routine postpartum course  - discussed vaginal precautions  - discussed routine postpartum care  - anticipate d/c home today with followup with PMD in one week     Dr. Diaz aware and Dr. Landeros to be made aware

## 2019-11-19 NOTE — PROGRESS NOTE ADULT - SUBJECTIVE AND OBJECTIVE BOX
DION MUHAMMAD  34y ppd2 no events overnight, bp in normal range no ha/ no blurry vision lochia mild       T(F): 96.4 (11-19-19 @ 07:50), Max: 97.8 (11-18-19 @ 15:22)  HR: 83 (11-19-19 @ 07:50) (82 - 95)  BP: 130/70 (11-19-19 @ 07:50) (107/59 - 130/70)  ABP: --  ABP(mean): --  RR: 18 (11-19-19 @ 07:50) (17 - 18)  SpO2: --        PHYSICAL EXAM:  GENERAL: NAD, well-appearing  CHEST/LUNG: Clear to auscultation bilaterally  HEART: Regular rate and rhythm  ABDOMEN: Soft, Nontender, Nondistended;  fundus form   EXTREMITIES:  No clubbing, cyanosis, or edema  breast: nonengorged                               10.7   32.19 )-----------( 161      ( 18 Nov 2019 12:54 )             32.8       Auto Neutrophil %: 92.2 % (11-18-19 @ 12:54)            LFTs: pending this am to be drawn at 11

## 2019-11-19 NOTE — DISCHARGE NOTE OB - PATIENT PORTAL LINK FT
You can access the FollowMyHealth Patient Portal offered by [facility] by registering at the following website: [email or web address]. By joining [employer]’s FollowMyHealth portal, you will also be able to view your health information using other applications (apps) compatible with our system.

## 2019-11-23 DIAGNOSIS — Z3A.39 39 WEEKS GESTATION OF PREGNANCY: ICD-10-CM

## 2020-03-20 NOTE — OB RN TRIAGE NOTE - NSLDARRIVAL_OBGYN_ALL_OB_DIFF_HHMM
Principal Discharge DX:	Viral upper respiratory tract infection  Secondary Diagnosis:	Chest wall pain
1 Hour(s) 52 Minute(s)

## 2021-10-22 PROBLEM — Z00.00 ENCOUNTER FOR PREVENTIVE HEALTH EXAMINATION: Status: ACTIVE | Noted: 2021-10-22

## 2021-11-10 ENCOUNTER — APPOINTMENT (OUTPATIENT)
Dept: SURGERY | Facility: CLINIC | Age: 36
End: 2021-11-10
Payer: COMMERCIAL

## 2021-11-10 VITALS
BODY MASS INDEX: 20.77 KG/M2 | SYSTOLIC BLOOD PRESSURE: 122 MMHG | HEART RATE: 87 BPM | DIASTOLIC BLOOD PRESSURE: 80 MMHG | WEIGHT: 110 LBS | HEIGHT: 61 IN | OXYGEN SATURATION: 99 %

## 2021-11-10 DIAGNOSIS — K64.4 RESIDUAL HEMORRHOIDAL SKIN TAGS: ICD-10-CM

## 2021-11-10 DIAGNOSIS — Z86.11 PERSONAL HISTORY OF TUBERCULOSIS: ICD-10-CM

## 2021-11-10 DIAGNOSIS — Z82.49 FAMILY HISTORY OF ISCHEMIC HEART DISEASE AND OTHER DISEASES OF THE CIRCULATORY SYSTEM: ICD-10-CM

## 2021-11-10 DIAGNOSIS — K58.9 IRRITABLE BOWEL SYNDROME W/OUT DIARRHEA: ICD-10-CM

## 2021-11-10 DIAGNOSIS — Z78.9 OTHER SPECIFIED HEALTH STATUS: ICD-10-CM

## 2021-11-10 DIAGNOSIS — Z80.0 FAMILY HISTORY OF MALIGNANT NEOPLASM OF DIGESTIVE ORGANS: ICD-10-CM

## 2021-11-10 DIAGNOSIS — K64.9 UNSPECIFIED HEMORRHOIDS: ICD-10-CM

## 2021-11-10 DIAGNOSIS — K59.09 OTHER CONSTIPATION: ICD-10-CM

## 2021-11-10 PROCEDURE — 46600 DIAGNOSTIC ANOSCOPY SPX: CPT

## 2021-11-10 PROCEDURE — 99203 OFFICE O/P NEW LOW 30 MIN: CPT | Mod: 25

## 2021-11-10 NOTE — ASSESSMENT
[FreeTextEntry1] : 36F with residual hemorrhoidal skin tags, constipation\par \par I discussed the pathology of skin tags and constipation. I recommended a high fiber diet, fiber supplement, increased water intake and laxatives as needed. She will return in 2-3 months.

## 2021-11-10 NOTE — HISTORY OF PRESENT ILLNESS
[FreeTextEntry1] : Patient is a 36F with PMH of TB (30yrs ago), constipation who presents for evaluation of hemorrhoids.  Patient states she developed large hemorrhoids with the birth of her daughter.  She has a BM every 3-4 days and has swelling and irritation with her BM. Patient denies fevers, chills, nausea, vomiting, abdominal pain, diarrhea, blood in his stool or unexpected weight loss.  Patient denies a family history of colon cancer rectal cancer or inflammatory bowel disease.  Patient 's last colonoscopy was 2018 with Dr. Wynn and showed hemorrhoids.\par

## 2021-11-10 NOTE — PROCEDURE
[FreeTextEntry1] : BRO and anoscopy show large skin tags, normal sphincter tone, normal internal hemorrhoids and no masses.\par

## 2022-02-09 ENCOUNTER — APPOINTMENT (OUTPATIENT)
Dept: SURGERY | Facility: CLINIC | Age: 37
End: 2022-02-09

## 2024-07-17 NOTE — OB RN PATIENT PROFILE - BRADEN SCORE (IF 18 OR LESS ACTIVATE SKIN INJURY RISK INCREASED GUIDELINE), MLM
Triage call transferred.   Spoke with pt stating is due for mammogram. Noted in care everywhere:  BILATERAL DIGITAL SCREENING MAMMOGRAM 3D/2D WITH CAD: 3/21/2023   IMPRESSION:   MAMMOGRAPHY NEGATIVE   There is no mammographic evidence of malignancy. A 1 year screening mammogram is recommended.     Pt requesting mammo order be mailed to pt. Confirmed address on file.  No further questions or concerns. Pt verbalized understanding and agreed with POC.   21

## 2024-12-09 ENCOUNTER — APPOINTMENT (OUTPATIENT)
Dept: ORTHOPEDIC SURGERY | Facility: CLINIC | Age: 39
End: 2024-12-09

## 2025-03-06 NOTE — OB RN DELIVERY SUMMARY - NS_PLACENTA_OBGYN_ALL_OB_DT
LMSW received a  referral from the ED medical staff for discharge planning. Patient is a 43 -year-old female presented to the ED for headaches.     LSMW introduced herself to the patient and she verbalized understanding the role of the . Demographic information was reviewed and confirmed.  Patient has Medicaid insurance benefit.  LMSW contacted [facility] ([phone]) to arrange transportation. Confirmation Number: [id number].    Patient provided information regarding her assigned trip. No concerns voiced.  SW team remains available if requested. 17-Nov-2019 21:10